# Patient Record
Sex: MALE | Race: WHITE | Employment: FULL TIME | URBAN - NONMETROPOLITAN AREA
[De-identification: names, ages, dates, MRNs, and addresses within clinical notes are randomized per-mention and may not be internally consistent; named-entity substitution may affect disease eponyms.]

---

## 2020-11-13 ENCOUNTER — APPOINTMENT (OUTPATIENT)
Dept: GENERAL RADIOLOGY | Age: 35
End: 2020-11-13
Payer: COMMERCIAL

## 2020-11-13 ENCOUNTER — HOSPITAL ENCOUNTER (EMERGENCY)
Facility: HOSPITAL | Age: 35
Discharge: HOME | End: 2020-11-13

## 2020-11-13 ENCOUNTER — HOSPITAL ENCOUNTER (EMERGENCY)
Age: 35
Discharge: HOME | End: 2020-11-13
Attending: EMERGENCY MEDICINE
Payer: COMMERCIAL

## 2020-11-13 VITALS
DIASTOLIC BLOOD PRESSURE: 80 MMHG | OXYGEN SATURATION: 94 % | TEMPERATURE: 98.5 F | HEART RATE: 83 BPM | RESPIRATION RATE: 18 BRPM | SYSTOLIC BLOOD PRESSURE: 122 MMHG

## 2020-11-13 LAB
ALBUMIN SERPL-MCNC: 4.7 G/DL (ref 3.5–5.2)
ALP BLD-CCNC: 86 U/L (ref 40–130)
ALT SERPL-CCNC: 9 U/L (ref 5–41)
ANION GAP SERPL CALCULATED.3IONS-SCNC: 8 MMOL/L (ref 7–19)
AST SERPL-CCNC: 7 U/L (ref 5–40)
BASOPHILS ABSOLUTE: 0 K/UL (ref 0–0.2)
BASOPHILS RELATIVE PERCENT: 0.5 % (ref 0–1)
BILIRUB SERPL-MCNC: 0.6 MG/DL (ref 0.2–1.2)
BUN BLDV-MCNC: 12 MG/DL (ref 6–20)
CALCIUM SERPL-MCNC: 9 MG/DL (ref 8.6–10)
CHLORIDE BLD-SCNC: 98 MMOL/L (ref 98–111)
CO2: 28 MMOL/L (ref 22–29)
CREAT SERPL-MCNC: 0.5 MG/DL (ref 0.5–1.2)
D DIMER: 0.29 UG/ML FEU (ref 0–0.48)
EOSINOPHILS ABSOLUTE: 0.1 K/UL (ref 0–0.6)
EOSINOPHILS RELATIVE PERCENT: 1.1 % (ref 0–5)
GFR AFRICAN AMERICAN: >59
GFR NON-AFRICAN AMERICAN: >60
GLUCOSE BLD-MCNC: 304 MG/DL (ref 74–109)
HCT VFR BLD CALC: 44 % (ref 42–52)
HEMOGLOBIN: 15 G/DL (ref 14–18)
IMMATURE GRANULOCYTES #: 0 K/UL
LYMPHOCYTES ABSOLUTE: 2.7 K/UL (ref 1.1–4.5)
LYMPHOCYTES RELATIVE PERCENT: 31.9 % (ref 20–40)
MCH RBC QN AUTO: 28 PG (ref 27–31)
MCHC RBC AUTO-ENTMCNC: 34.1 G/DL (ref 33–37)
MCV RBC AUTO: 82.2 FL (ref 80–94)
MONOCYTES ABSOLUTE: 0.5 K/UL (ref 0–0.9)
MONOCYTES RELATIVE PERCENT: 6.1 % (ref 0–10)
NEUTROPHILS ABSOLUTE: 5.1 K/UL (ref 1.5–7.5)
NEUTROPHILS RELATIVE PERCENT: 60.2 % (ref 50–65)
PDW BLD-RTO: 13 % (ref 11.5–14.5)
PLATELET # BLD: 257 K/UL (ref 130–400)
PMV BLD AUTO: 9.7 FL (ref 9.4–12.4)
POTASSIUM SERPL-SCNC: 3.9 MMOL/L (ref 3.5–5)
PRO-BNP: 11 PG/ML (ref 0–450)
RBC # BLD: 5.35 M/UL (ref 4.7–6.1)
SODIUM BLD-SCNC: 134 MMOL/L (ref 136–145)
TOTAL PROTEIN: 7 G/DL (ref 6.6–8.7)
TROPONIN: <0.01 NG/ML (ref 0–0.03)
WBC # BLD: 8.4 K/UL (ref 4.8–10.8)

## 2020-11-13 PROCEDURE — 71045 X-RAY EXAM CHEST 1 VIEW: CPT

## 2020-11-13 PROCEDURE — 93225 XTRNL ECG REC<48 HRS REC: CPT

## 2020-11-13 PROCEDURE — 99282 EMERGENCY DEPT VISIT SF MDM: CPT

## 2020-11-13 PROCEDURE — 83880 ASSAY OF NATRIURETIC PEPTIDE: CPT

## 2020-11-13 PROCEDURE — 85379 FIBRIN DEGRADATION QUANT: CPT

## 2020-11-13 PROCEDURE — 99999 PR OFFICE/OUTPT VISIT,PROCEDURE ONLY: CPT | Performed by: PHYSICIAN ASSISTANT

## 2020-11-13 PROCEDURE — 85025 COMPLETE CBC W/AUTO DIFF WBC: CPT

## 2020-11-13 PROCEDURE — 84484 ASSAY OF TROPONIN QUANT: CPT

## 2020-11-13 PROCEDURE — 93229 REMOTE 30 DAY ECG TECH SUPP: CPT

## 2020-11-13 PROCEDURE — 36415 COLL VENOUS BLD VENIPUNCTURE: CPT

## 2020-11-13 PROCEDURE — 80053 COMPREHEN METABOLIC PANEL: CPT

## 2020-11-13 RX ORDER — PREDNISONE 10 MG/1
10 TABLET ORAL DAILY
Qty: 10 TABLET | Refills: 0 | Status: SHIPPED | OUTPATIENT
Start: 2020-11-13 | End: 2020-11-23

## 2020-11-13 SDOH — HEALTH STABILITY: MENTAL HEALTH: HOW OFTEN DO YOU HAVE A DRINK CONTAINING ALCOHOL?: NEVER

## 2020-11-13 ASSESSMENT — PAIN DESCRIPTION - DESCRIPTORS: DESCRIPTORS: SHARP

## 2020-11-13 ASSESSMENT — ENCOUNTER SYMPTOMS
SHORTNESS OF BREATH: 0
PHOTOPHOBIA: 0
EYE ITCHING: 0
APNEA: 0
COLOR CHANGE: 0
COUGH: 0
EYE DISCHARGE: 0
BACK PAIN: 0

## 2020-11-13 ASSESSMENT — PAIN SCALES - GENERAL: PAINLEVEL_OUTOF10: 5

## 2020-11-13 ASSESSMENT — PAIN DESCRIPTION - LOCATION: LOCATION: CHEST

## 2020-11-13 ASSESSMENT — PAIN DESCRIPTION - PAIN TYPE: TYPE: ACUTE PAIN

## 2020-11-13 NOTE — ED PROVIDER NOTES
Attending Supervisory Note/Shared Visit    I have reviewed the mid-levels findings and agree. We have discussed the case and reviewed the diagnostic data. I am told the pain is reproducible. I am in agreement with the plan and disposition.   Bernardo Fam MD  Attending Emergency Physician        Brie Levin MD  11/13/20 [id number]

## 2020-11-13 NOTE — ED PROVIDER NOTES
[facility] EMERGENCY DEPT  eMERGENCYdEPARTMENT eNCOUnter      Pt Name: Alejandro Walker  MRN: [id number]  [place] 1985  Date of evaluation: 11/13/2020  Provider:MACKENZIE Sorensen    CHIEF COMPLAINT       Chief Complaint   Patient presents with    Pleurisy    Chest Pain         HISTORY OF PRESENT ILLNESS  (Location/Symptom, Timing/Onset, Context/Setting, Quality, Duration, Modifying Factors, Severity.)   Alejandro Walker is a 37 y.o. male who presents to the emergency department with complaints of pleurisy chest pain he has tenderness to the left aspect of his sternum that is reproduced with pressure. He states it hurts with inspiration denies shortness of breath. Had a thorough cardiac work-up last night and Bettina he tells me his EKG in cardiac enzymes were normal.  He has never had a cardiac work-up before this. He states that there this is reproducible with position. He denies any recent activity that would correlate with musculoskeletal injury. Never been diagnosed with pleurisy before. No prior PE or DVT history. No prior surgery or recent travel. No hormonal therapy or cancer. HPI    Nursing Notes were reviewed and I agree. REVIEW OF SYSTEMS    (2-9 systems for level 4, 10 or more for level 5)     Review of Systems   Constitutional: Negative for activity change, appetite change, chills and fever. HENT: Negative for congestion and dental problem. Eyes: Negative for photophobia, discharge and itching. Respiratory: Negative for apnea, cough and shortness of breath. Cardiovascular: Positive for chest pain. Musculoskeletal: Negative for arthralgias, back pain, gait problem, myalgias and neck pain. Skin: Negative for color change, pallor and rash. Neurological: Negative for dizziness, seizures and syncope. Psychiatric/Behavioral: Negative for agitation. The patient is not nervous/anxious. Except as noted above the remainder of the review of systems was reviewed and negative. Not on file     Physically abused: Not on file     Forced sexual activity: Not on file   Other Topics Concern    Not on file   Social History Narrative    Not on file       SCREENINGS           PHYSICAL EXAM    (up to 7 forlevel 4, 8 or more for level 5)     ED Triage Vitals   BP Temp Temp src Pulse Resp SpO2 Height Weight   11/13/20 1602 11/13/20 1559 -- 11/13/20 1602 11/13/20 1602 11/13/20 1602 -- --   122/80 98.5 °F (36.9 °C)  83 18 94 %         Physical Exam  Vitals signs and nursing note reviewed. Constitutional:       General: He is not in acute distress. Appearance: Normal appearance. He is well-developed. He is not diaphoretic. HENT:      Head: Normocephalic and atraumatic. Right Ear: Tympanic membrane, ear canal and external ear normal.      Left Ear: Tympanic membrane, ear canal and external ear normal.   Eyes:      Pupils: Pupils are equal, round, and reactive to light. Neck:      Musculoskeletal: Normal range of motion and neck supple. Trachea: No tracheal deviation. Cardiovascular:      Rate and Rhythm: Normal rate and regular rhythm. Pulses: Normal pulses. Heart sounds: Normal heart sounds. No murmur. Pulmonary:      Effort: Pulmonary effort is normal.      Breath sounds: Normal breath sounds. No stridor. No wheezing. Chest:      Chest wall: No tenderness. Abdominal:      General: Abdomen is flat. Bowel sounds are normal. There is no distension. Palpations: Abdomen is soft. Tenderness: There is no abdominal tenderness. Musculoskeletal: Normal range of motion. General: Tenderness present. Arms:    Skin:     General: Skin is warm and dry. Capillary Refill: Capillary refill takes less than 2 seconds. Neurological:      General: No focal deficit present. Mental Status: He is alert and oriented to person, place, and time. Mental status is at baseline.    Psychiatric:         Mood and Affect: Mood normal.         Behavior: Behavior normal.         Thought Content: Thought content normal.         Judgment: Judgment normal.           DIAGNOSTIC RESULTS     RADIOLOGY:   Non-plain film images such as CT, Ultrasound and MRI are read by the radiologist. Plain radiographic images are visualized and preliminarilyinterpreted by No att. providers found with the below findings:      Interpretation per the Radiologist below, if available at the time of this note:    XR CHEST PORTABLE   Final Result   1. No acute cardiopulmonary finding. Signed by Dr Jace Soler on 11/13/2020 4:29 PM          LABS:  Labs Reviewed   COMPREHENSIVE METABOLIC PANEL - Abnormal; Notable for the following components:       Result Value    Sodium 134 (*)     Glucose 304 (*)     All other components within normal limits   CBC WITH AUTO DIFFERENTIAL   TROPONIN   BRAIN NATRIURETIC PEPTIDE   D-DIMER, QUANTITATIVE       All other labs were within normal range or notreturned as of this dictation. RE-ASSESSMENT        EMERGENCY DEPARTMENT COURSE and DIFFERENTIAL DIAGNOSIS/MDM:   Vitals:    Vitals:    11/13/20 1559 11/13/20 1602   BP:  122/80   Pulse:  83   Resp:  18   Temp: 98.5 °F (36.9 °C)    SpO2:  94%       MDM  Patient has a negative cardiac work-up we have placed him with a Zio patch plan him with prednisone for pleurisy and musculoskeletal chest pain follow with PCP for eval of Zio patch monitoring. Return to ED if anything should worsen. Based on the fact this pain is reproducible and heart score of 1 feel appropriate to discharge as well as no CTA indicated with dimer. PROCEDURES:    Procedures      FINAL IMPRESSION      1. Pleurisy    2. Chest wall pain          DISPOSITION/PLAN   DISPOSITION Decision To Discharge 11/13/2020 07:04:10 PM      PATIENT REFERRED TO:  [facility] EMERGENCY DEPT  Hiro Manriquez  [phone]    If symptoms worsen    [place] [id number] [phone]  Schedule an appointment as soon as possible for a visit         DISCHARGE MEDICATIONS:  Discharge Medication List as of 11/13/2020  5:24 PM      START taking these medications    Details   predniSONE (DELTASONE) 10 MG tablet Take 1 tablet by mouth daily for 10 days, Disp-10 tablet,R-0Print             (Please note that portions of this note were completed with a voice recognition program.  Efforts were made to edit the dictations but occasionallywords are mis-transcribed.)    Too Ramirez [facility], [place]  11/13/20 0460

## 2020-11-13 NOTE — ED TRIAGE NOTES
Pt here with sharp chest pain for 1 week pt notes no cardiac hx.   Pt seen at [facility] yesterday and d/c

## 2020-11-19 LAB
EKG P AXIS: 43 DEGREES
EKG P-R INTERVAL: 150 MS
EKG Q-T INTERVAL: 346 MS
EKG QRS DURATION: 80 MS
EKG QTC CALCULATION (BAZETT): 382 MS
EKG T AXIS: 33 DEGREES

## 2022-03-02 ENCOUNTER — HOSPITAL ENCOUNTER (OUTPATIENT)
Dept: NEUROLOGY | Age: 37
Discharge: HOME | End: 2022-03-02
Payer: MEDICAID

## 2022-03-02 PROCEDURE — 95909 NRV CNDJ TST 5-6 STUDIES: CPT | Performed by: PSYCHIATRY & NEUROLOGY

## 2022-03-02 PROCEDURE — 95886 MUSC TEST DONE W/N TEST COMP: CPT

## 2022-03-02 PROCEDURE — 95909 NRV CNDJ TST 5-6 STUDIES: CPT

## 2022-03-02 PROCEDURE — 95886 MUSC TEST DONE W/N TEST COMP: CPT | Performed by: PSYCHIATRY & NEUROLOGY

## 2022-03-03 ENCOUNTER — TELEPHONE (OUTPATIENT)
Dept: NEUROLOGY | Age: 37
End: 2022-03-03

## 2022-03-03 NOTE — TELEPHONE ENCOUNTER
[facility] called to ask the office to fax over the patient nerve conduction results again. They stated it was blurry.  Please fax to [phone]

## 2022-05-02 PROBLEM — E11.9 DIABETES MELLITUS (HCC): Status: ACTIVE | Noted: 2020-08-18

## 2023-02-10 ENCOUNTER — OFFICE VISIT (OUTPATIENT)
Dept: NEUROLOGY | Age: 38
End: 2023-02-10

## 2023-02-10 VITALS
HEART RATE: 102 BPM | BODY MASS INDEX: 21.98 KG/M2 | HEIGHT: 68 IN | WEIGHT: 145 LBS | SYSTOLIC BLOOD PRESSURE: 135 MMHG | OXYGEN SATURATION: 98 % | DIASTOLIC BLOOD PRESSURE: 89 MMHG

## 2023-02-10 DIAGNOSIS — M54.50 LUMBAR PAIN: ICD-10-CM

## 2023-02-10 DIAGNOSIS — M54.6 MIDLINE THORACIC BACK PAIN, UNSPECIFIED CHRONICITY: Primary | ICD-10-CM

## 2023-02-10 DIAGNOSIS — R20.9 ALTERATIONS OF SENSATIONS: ICD-10-CM

## 2023-02-10 DIAGNOSIS — R26.9 ALTERED GAIT: ICD-10-CM

## 2023-02-10 RX ORDER — OMEPRAZOLE 40 MG/1
CAPSULE, DELAYED RELEASE ORAL
COMMUNITY
Start: 2023-01-30

## 2023-02-10 RX ORDER — GABAPENTIN 600 MG/1
TABLET ORAL
COMMUNITY
Start: 2023-02-02

## 2023-02-10 RX ORDER — FLUTICASONE PROPIONATE 50 MCG
SPRAY, SUSPENSION (ML) NASAL
COMMUNITY
Start: 2022-12-08

## 2023-02-10 RX ORDER — LANOLIN ALCOHOL/MO/W.PET/CERES
CREAM (GRAM) TOPICAL
COMMUNITY
Start: 2022-04-29

## 2023-02-10 RX ORDER — NAPROXEN 500 MG/1
TABLET, DELAYED RELEASE ORAL
COMMUNITY
Start: 2023-01-03

## 2023-02-10 RX ORDER — BLOOD-GLUCOSE METER
KIT MISCELLANEOUS
COMMUNITY
Start: 2023-02-04

## 2023-02-10 RX ORDER — TIZANIDINE 4 MG/1
TABLET ORAL
COMMUNITY
Start: 2023-02-06

## 2023-02-10 RX ORDER — ONDANSETRON 4 MG/1
TABLET, FILM COATED ORAL
COMMUNITY
Start: 2023-01-12

## 2023-02-10 RX ORDER — MECLIZINE HYDROCHLORIDE 25 MG/1
TABLET ORAL
COMMUNITY
Start: 2023-02-04

## 2023-02-10 RX ORDER — SEMAGLUTIDE 1.34 MG/ML
INJECTION, SOLUTION SUBCUTANEOUS
COMMUNITY
Start: 2023-01-08

## 2023-02-10 RX ORDER — CETIRIZINE HYDROCHLORIDE 10 MG/1
TABLET ORAL
COMMUNITY
Start: 2023-01-22

## 2023-02-10 NOTE — PROGRESS NOTES
[facility] Neurology Office Note      Patient:   Mili Dinh  MR#:    [id number]  Account Number:                         YOB: 1985  Date of Evaluation:  2/13/2023  Time of Note:                          1:23 PM  Primary/Referring Physician:  Jennifer Can MD   Consulting Physician:  DARON Lee    NEW PATIENT CONSULTATION      Chief Complaint   Patient presents with    New Patient     Neuropathy         HISTORY OF PRESENT ILLNESS    Mili Dinh is a 30y.o. year old male here for here for for lumbar pain. He also has known diabetic neuropathy. He has BLE burning/numbness/tingling pain that is controlled on Gabapentin 600 mg TID. He states he is mainly here today for lumbar pain. He has known degenerative disc disease. Has recently had MRI lumbar spine at the end of January, impression reads that there is no significant stenosis or acute process seen. I do not have these images to review. States pain is severe and worsening. It is daily and constant with waxing and waning severity. He feels like he is having left leg numbness and this is changing his gait. He does have some dizziness, feels it makes him fall at times. Last fall was 2 days ago. Is on meclizine, helps sometimes. He has had MRI lumbar from PCP 1/27/22 noting no spinal canal stenosis, DDD. Applying heat helps with pain. He states things that make it worse are walking distances. No precipitating injury. He is on Zanaflex and Naproxen with only mild benefit. States back pain is 8/10 today. States he has been off work for 2 weeks. He is ale[employer] at [employer]. States he is having some weight loss. He is on Ozempic for DM, states sugar is well-controlled. EMG/NCV 3/2/22 did show severe primary axonal, peripheral neuropathy to the bilateral lower extremities. Past Medical History:   Diagnosis Date    Diabetes mellitus ([facility] [address]        History reviewed. No pertinent surgical history. History reviewed.  No pertinent family history.     Social History     Socioeconomic History    Marital status:      Spouse name: Not on file    Number of children: Not on file    Years of education: Not on file    Highest education level: Not on file   Occupational History    Not on file   Tobacco Use    Smoking status: Some Days     Types: Cigarettes    Smokeless tobacco: Never   Substance and Sexual Activity    Alcohol use: Never    Drug use: Never    Sexual activity: Not on file   Other Topics Concern    Not on file   Social History Narrative    Not on file     Social Determinants of Health     Financial Resource Strain: Not on file   Food Insecurity: Not on file   Transportation Needs: Not on file   Physical Activity: Not on file   Stress: Not on file   Social Connections: Not on file   Intimate Partner Violence: Not on file   Housing Stability: Not on file       Current Outpatient Medications   Medication Sig Dispense Refill    gabapentin (NEURONTIN) 600 MG tablet TAKE 1 TABLET BY MOUTH THREE TIMES A DAY AS NEEDED      cetirizine (ZYRTEC) 10 MG tablet TAKE 1 TABLET BY MOUTH EVERY DAY      ferrous sulfate (FE TABS 325) 325 (65 Fe) MG EC tablet       fluticasone (FLONASE) 50 MCG/ACT nasal spray SPRAY 2 SPRAYS INTO EACH NOSTRIL EVERY DAY      FREESTYLE LITE strip USE TO CHECK BLOOD SUGAR BEFORE MEALS AND AT BEDTIME      meclizine (ANTIVERT) 25 MG tablet TAKE 1 TABLET BY MOUTH AS NEEDED EVERY 8HRS FOR VERTIGO      EC-NAPROXEN 500 MG EC tablet TAKE 1 TABLET BY MOUTH EVERY DAY      omeprazole (PRILOSEC) 40 MG delayed release capsule TAKE 1 CAPSULE BY MOUTH EVERY DAY      ondansetron (ZOFRAN) 4 MG tablet TAKE 1 TABLET BY MOUTH AS NEEDED EVERY 6HRS FOR NAUSEA/VOMITING      OZEMPIC, 0.25 OR 0.5 MG/DOSE, 2 MG/1.5ML SOPN 0.25 MILLIGRAMS SUBCUTANEOUS WEEKLY ON WEDNESDAY      tiZANidine (ZANAFLEX) 4 MG tablet TAKE 1 TABLET BY MOUTH TWICE A DAY AS NEEDED      metFORMIN (GLUCOPHAGE) 500 MG tablet Take 500 mg by mouth 2 times daily (with meals)      insulin glargine (LANTUS;BASAGLAR) 100 UNIT/ML injection pen Inject 25 Units into the skin 2 times daily (Patient not taking: Reported on 2/10/2023)      Insulin Regular Human (HUMULIN R IJ) Inject as directed Sliding scale (Patient not taking: Reported on 2/10/2023)       No current facility-administered medications for this visit. No Known Allergies      REVIEW OF SYSTEMS  Constitutional: []Fever []Sweat []Chills [] Recent Injury [x] Denies all unless marked  HEENT:[x]Headache  [] Head Injury/Hearing Loss  [] Sore Throat  [] Ear Ache/Dizziness  [x] Denies all unless marked  Spine:  [x] Neck pain  [x] Back pain  [] Sciaticia  [x] Denies all unless marked  Cardiovascular:[]Heart Disease []Chest Pain [] Palpitations  [x] Denies all unless marked  Pulmonary: []Shortness of Breath []Cough   [x] Denies all unless marke  Gastrointestinal: []Nausea  []Vomiting  []Abdominal Pain  []Constipation  []Diarrhea  []Dark Bloody Stools  [x] Denies all unless marked  Psychiatric/Behavioral:[x] Depression [] Anxiety [x] Denies all unless marked  Genitourinary:   [] Frequency  [] Urgency  [] Incontinence [] Pain with Urination  [x] Denies all unless marked  Extremities: []Pain  []Swelling  [x] Denies all unless marked  Musculoskeletal: [x] Muscle Pain  [] Joint Pain  [] Arthritis [] Muscle Cramps [] Muscle Twitches  [x] Denies all unless marked  Sleep: [x] Insomnia [] Snoring [] Restless Legs [] Sleep Apnea  [] Daytime Sleepiness  [x] Denies all unless marked  Skin:[] Rash [] Skin Discoloration [x] Denies all unless marked   Neurological: []Visual Disturbance/Memory Loss [x] Loss of Balance [] Slurred Speech/Weakness [] Seizures  [x] Vertigo/Dizziness [x] Denies all unless marked       The MA has completed the ROS with the patient. I have reviewed it in its' entirety with the patient and agree with the documentation.      PHYSICAL EXAM  /89   Pulse (!) 102   Ht 5' 8\" (1.727 m)   Wt 145 lb (65.8 kg)   SpO2 98%   BMI 22.05 kg/m²       Constitutional - No acute distress    HEENT- Conjunctiva normal.  No scars, masses, or lesions over external nose or ears, no neck masses noted, no jugular vein distension, no bruit  Cardiac- Regular rate and rhythm  Pulmonary- Good expansion, normal effort without use of accessory muscles  Musculoskeletal - No significant wasting of muscles noted, no bony deformities  Extremities - No clubbing, cyanosis or edema  Skin - Warm, dry, and intact. No rash, erythema, or pallor  Psychiatric - Mood, affect, and behavior appear normal      NEUROLOGICAL EXAM     Mental status   [x] Awake, alert, oriented   [x]Affect attention and concentration appear appropriate  [x]Recent and remote memory appears unremarkable  [x]Speech normal without dysarthria or aphasia, comprehension and repetition intact. COMMENTS:    Cranial Nerves [x]No VF deficit to confrontation  [x]PERRLA, EOMI, no nystagmus, conjugate eye movements, no ptosis  [x]Face symmetric  [x]Facial sensation intact  [x]Tongue midline no atrophy or fasciculations present  [x]Palate midline, hearing to finger rub normal bilaterally  [x]Shoulder shrug and SCM testing normal bilaterally  COMMENTS:   Motor   [x]5/5 strength x 4 extremities  [x]Normal bulk and tone  [x]No tremor present  [x]No rigidity or bradykinesia noted  COMMENTS:   Sensory  []Sensation intact to light touch, pin prick, vibration, and proprioception BLE  []Sensation intact to light touch, pin prick, vibration, and proprioception BUE  COMMENTS:patchy PP palms, absent PP bilateral feet   Coordination [x]FTN normal bilaterally   []HTS normal bilaterally  [x]CHERELLE normal bilaterally.    COMMENTS:   Reflexes  [x]Symmetric and non-pathological  []Toes down going bilaterally  [x]No clonus present  COMMENTS:   Gait                  []Normal steady gait    []Ataxic    []Spastic     []Magnetic     []Shuffling  COMMENTS:antalgic       LABS RECORD AND IMAGING REVIEW (As below and per HPI)    No results found for: Fabiola Wren  Lab Results   Component Value Date    WBC 8.4 11/13/2020    HGB 15.0 11/13/2020    HCT 44.0 11/13/2020    MCV 82.2 11/13/2020     11/13/2020     Lab Results   Component Value Date     (L) 11/13/2020    K 3.9 11/13/2020    CL 98 11/13/2020    CO2 28 11/13/2020    BUN 12 11/13/2020    CREATININE 0.5 11/13/2020    GLUCOSE 304 (H) 11/13/2020    CALCIUM 9.0 11/13/2020    PROT 7.0 11/13/2020    LABALBU 4.7 11/13/2020    BILITOT 0.6 11/13/2020    ALKPHOS 86 11/13/2020    AST 7 11/13/2020    ALT 9 11/13/2020    LABGLOM >60 11/13/2020    GFRAA >59 11/13/2020         Reviewed referral records     Carmenza Jorge MD (Physician)   Neurology                    MRI lumbar spine January 27, 2022  Spinal cord is normal in signal and position. There is no significant stenosis or acute process seen. ASSESSMENT:    Marlynn Schirmer is a 30y.o. year old male here for evaluation of lumbar back pain. He has known severe diabetic neuropathy to bilateral lower extremities that is well controlled on gabapentin 600 mg 3 times daily. He is here today primarily for severe lumbar pain, feels it is affecting his ability to work. He states pain is made worse with prolonged sitting or standing or walking. MRI lumbar that was just completed did not show clear reason for the symptoms. It did not note any neural foraminal stenosis nor did not note any spinal canal stenosis. However I do not have images to review for myself as this was done outside of our network. Exam today is consistent with neuropathy. Symptoms are not consistent with neurogenic claudication and he denies any radicular type pain. Feels pain is primarily to lumbar back, there is some pain to thoracic back as well. He is also requesting pain medicine, educated that out of neurology we do not treat chronic pain. I will refer him to pain management today.   He is also discussing the possibility of disability as well, I am not comfortable doing this today. He is treated for neuropathy by PCP. Due to changes in gait, dizziness, and pain to thoracic spine I will further evaluate with imaging to MRI brain, MRI T-spine, and MRI C-spine. ICD-10-CM    1. Midline thoracic back pain, unspecified chronicity  M54.6 MRI THORACIC SPINE W WO CONTRAST      2. Lumbar pain  M54.50       3. Altered gait  R26.9 MRI BRAIN W WO CONTRAST     MRI CERVICAL SPINE W WO CONTRAST     MRI THORACIC SPINE W WO CONTRAST      4. Alterations of sensations  R20.9 MRI BRAIN W WO CONTRAST     MRI CERVICAL SPINE W WO CONTRAST     MRI THORACIC SPINE W WO CONTRAST          PLAN:  MRI imaging records needed of L spine MRI. MRI Brain W WO, MRI Thoracic, C spine. Referral to [facility] pain management. 4. Return in about 4 weeks (around 3/10/2023) for Follow up, sooner with worsening symptoms.     Mandy Lau APRN

## 2023-02-10 NOTE — PROGRESS NOTES
REVIEW OF SYSTEMS    Constitutional: []Fever []Sweat []Chills [] Recent Injury [x] Denies all unless marked  HEENT:[x]Headache  [] Head Injury/Hearing Loss  [] Sore Throat  [] Ear Ache/Dizziness  [x] Denies all unless marked  Spine:  [x] Neck pain  [x] Back pain  [] Sciaticia  [x] Denies all unless marked  Cardiovascular:[]Heart Disease []Chest Pain [] Palpitations  [x] Denies all unless marked  Pulmonary: []Shortness of Breath []Cough   [x] Denies all unless marke  Gastrointestinal: []Nausea  []Vomiting  []Abdominal Pain  []Constipation  []Diarrhea  []Dark Bloody Stools  [x] Denies all unless marked  Psychiatric/Behavioral:[x] Depression [] Anxiety [x] Denies all unless marked  Genitourinary:   [] Frequency  [] Urgency  [] Incontinence [] Pain with Urination  [x] Denies all unless marked  Extremities: []Pain  []Swelling  [x] Denies all unless marked  Musculoskeletal: [x] Muscle Pain  [] Joint Pain  [] Arthritis [] Muscle Cramps [] Muscle Twitches  [x] Denies all unless marked  Sleep: [x] Insomnia [] Snoring [] Restless Legs [] Sleep Apnea  [] Daytime Sleepiness  [x] Denies all unless marked  Skin:[] Rash [] Skin Discoloration [x] Denies all unless marked   Neurological: []Visual Disturbance/Memory Loss [x] Loss of Balance [] Slurred Speech/Weakness [] Seizures  [x] Vertigo/Dizziness [x] Denies all unless marked